# Patient Record
Sex: MALE | Race: WHITE
[De-identification: names, ages, dates, MRNs, and addresses within clinical notes are randomized per-mention and may not be internally consistent; named-entity substitution may affect disease eponyms.]

---

## 2017-06-06 ENCOUNTER — HOSPITAL ENCOUNTER (EMERGENCY)
Dept: HOSPITAL 45 - C.EDB | Age: 47
Discharge: HOME | End: 2017-06-06
Payer: COMMERCIAL

## 2017-06-06 VITALS
BODY MASS INDEX: 28.91 KG/M2 | BODY MASS INDEX: 28.91 KG/M2 | WEIGHT: 201.94 LBS | HEIGHT: 70 IN | WEIGHT: 201.94 LBS | HEIGHT: 70 IN

## 2017-06-06 VITALS — SYSTOLIC BLOOD PRESSURE: 139 MMHG | HEART RATE: 94 BPM | OXYGEN SATURATION: 94 % | DIASTOLIC BLOOD PRESSURE: 101 MMHG

## 2017-06-06 VITALS — TEMPERATURE: 98.24 F

## 2017-06-06 DIAGNOSIS — S61.211A: Primary | ICD-10-CM

## 2017-06-06 DIAGNOSIS — R03.0: ICD-10-CM

## 2017-06-06 DIAGNOSIS — Z88.5: ICD-10-CM

## 2017-06-06 DIAGNOSIS — S61.213A: ICD-10-CM

## 2017-06-06 DIAGNOSIS — Z23: ICD-10-CM
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
## 2017-06-06 DIAGNOSIS — K21.9: ICD-10-CM

## 2017-06-06 NOTE — EMERGENCY ROOM VISIT NOTE
History


First contact with patient:  19:43


Chief Complaint:  LACERATION/CUT (SUT/DERMABOND)


Stated Complaint:  CUT 2 FINGERS ON L HAND


Nursing Triage Summary:  


Lacerations to left index and middle fingers from a .





History of Present Illness


The patient is a 46 year old male who presents to the Emergency Room with 

complaints of lacerations to his left index and long finger.  The patient was 

working on a lawnmower when he cut his fingers on a fender.  The patient 

reports significant bleeding from the wounds.  He rates his discomfort a 2 out 

of 10.  The patient is right-hand-dominant.  He is uncertain of his last 

tetanus immunization.





Review of Systems


10 system review was performed and was negative except for pertinent positives 

and negatives as indicated in history of present illness





Past Medical/Surgical History


Medical Problems:


(1) GERD (gastroesophageal reflux disease)








Family History


Unremarkable





Social History


Smoking Status:  Never Smoker


Alcohol Use:  heavy


Marital Status:  


Housing Status:  lives with family


Occupation Status:  retired





Current/Historical Medications


Scheduled


Cephalexin Monohydrate (Keflex), 500 MG PO QID





Scheduled PRN


Acetaminophen (Tylenol), 1,000 MG PO Q6 PRN for Pain


Tramadol (Ultram), 1-2 TAB PO Q4H PRN for Pain





Allergies


Coded Allergies:  


     Codeine (Verified  Allergy, Unknown, 6/6/17)





Physical Exam


Vital Signs











  Date Time  Temp Pulse Resp B/P (MAP) Pulse Ox O2 Delivery O2 Flow Rate FiO2


 


6/6/17 19:38 36.8 88 16 149/95 99 Room Air  











Physical Exam


CONSTITUTIONAL:  Healthy and well nourished.  Alert and oriented X 3 with 

positive affect.  Patient does not appear in any acute distress.  Smell of EtOH 

is present.


HEENT:  Normocephalic, atraumatic.  Pupils equal, round and reactive.  


NECK:  Full active range of motion without discomfort.


MUSCULOSKELETAL: Examination shows lacerations of the left index and long 

fingers.  The lacerations are transverse in orientation across the middle 

phalanx regions.  No active bleeding noted.  The patient is able to flex his 

fingers against resistance.  Capillary refill is less than 2 seconds.  Total 

laceration length of both fingers is 3 cm.


INTEGUMENTARY:  No rash or other significant dermatologic conditions noted.


NEUROLOGIC: Left index and third fingertips are sensory intact.





Medical Decision & Procedures


Medications Administered











 Medications


  (Trade)  Dose


 Ordered  Sig/Jemima


 Route  Start Time


 Stop Time Status Last Admin


Dose Admin


 


 Lidocaine HCl


  (Buffered


 Lidocaine 1% Inj)  20 ml  ONE  ONCE


 INFIL  6/6/17 20:00


 6/6/17 20:02 DC 6/6/17 20:06


20 ML


 


 Diphtheria/


 Pertussis/Tetanus


 Vacc


  (Adacel Inj)  0.5 ml  ONCE ONCE


 IM.  6/6/17 20:00


 6/6/17 20:02 DC 6/6/17 20:08


0.5 ML


 


 Tramadol HCl


  (Ultram Home


 Pack)  1 homepack  UD  ONCE


 PO  6/6/17 21:00


 6/6/17 21:01 DC 6/6/17 21:13


1 HOMEPACK


 


 Cephalexin


 Monohydrate


  (Keflex 500MG


 Home Pack)  1 homepack  NOW  ONCE


 PO  6/6/17 21:00


 6/6/17 21:01 DC 6/6/17 21:12


1 HOMEPACK











Procedure


Laceration repair was performed under digital block anesthesia by our physician 

assistant student under my direct supervision.  The patient also provided 

verbal consent for this procedure.  Using buffered 1% lidocaine without 

epinephrine, good digital block anesthesia was administered.  The wounds were 

then peripherally cleansed with iodine.  The wounds were thoroughly pressure 

irrigated.  The patient did have an active bleeding arterial of the index 

finger.  At this point, a 5-0 nylon figure-of-eight suture was applied with 

excellent hemostasis.  The remaining wounds were then approximated using 5-0 

nylon simple interrupted sutures.  Bacitracin dressings were applied.





ED Course


Patient history and physical exam were performed.  Nurse's notes were reviewed.

  The patient was administered Adacel IM.  Laceration repair was performed 

under digital block anesthesia.  The patient was provided a prescription for 

Keflex to minimize risk for infection.  He was encouraged to alternate 

ibuprofen and Tylenol for baseline pain relief.  He did receive a home pack and 

prescription for Ultram as needed for breakthrough pain.  Suture removal in 12-

14 days, or seek reevaluation sooner for any signs of infection.  The patient 

was happy with plan of care, and denied any pain at the time of discharge.





The patient was also advised that his blood pressures were elevated while in 

the emergency department.  He was instructed to follow-up with his family 

doctor for blood pressure recheck.





Medical Decision








Impression





 Primary Impression:  


 Laceration of left index finger


 Additional Impressions:  


 Laceration of left middle finger


 Elevated blood pressure reading





Departure Information


Dispostion


Home / Self-Care





Prescriptions





Tramadol (Ultram) 50 Mg Tab


1-2 TAB PO Q4H Y for Pain, #20 TAB


   For Initial Treatment


   Prov: Rob Aldridge PA         6/6/17 


Cephalexin Monohydrate (Keflex) 500 Mg Cap


500 MG PO QID for 7 Days, #28 CAP


   Prov: Rob Aldridge PA         6/6/17





Forms


HOME CARE DOCUMENTATION FORM,                                                 

               IMPORTANT VISIT INFORMATION





Patient Instructions


[facility]





Additional Instructions





Keep wound clean and dry.  Do not allow any crusting or dried blood to 

accumulate on sutures.  If this occurs, use a 1:1 solution of hydrogen peroxide/

water on a Q-tip to clean the wound.  Use an antibiotic ointment for 3-4 days, 

then let wound dry.  Suture removal in 12-14 days.  You may call your family 

doctor to schedule an appointment for suture removal.  Return to the emergency 

department for any signs of infection (increasing redness, swelling, drainage).





Ice and elevate for swelling and pain. 


Complete all Keflex antibiotics as prescribed - Keflex 500 mg 4 times daily. 


Ibuprofen 800 mg and/or Tylenol 1000 mg every 8 hours.


You may also alternate these medications for more effective pain relief:


Ibuprofen --4 HRS--> Tylenol --4 HRS--> ibuprofen --4 HRS--> Tylenol ....


Ultram if needed for worse pain.





Problem Qualifiers








 Primary Impression:  


 Laceration of left index finger


 Encounter type:  initial encounter  Damage to nail status:  without damage  

Foreign body presence:  without foreign body  Qualified Codes:  S61.211A - 

Laceration without foreign body of left index finger without damage to nail, 

initial encounter


 Additional Impressions:  


 Laceration of left middle finger


 Encounter type:  initial encounter  Damage to nail status:  without damage  

Foreign body presence:  without foreign body  Qualified Codes:  S61.213A - 

Laceration without foreign body of left middle finger without damage to nail, 

initial encounter

## 2018-02-17 ENCOUNTER — HOSPITAL ENCOUNTER (EMERGENCY)
Dept: HOSPITAL 45 - C.EDB | Age: 48
Discharge: HOME | End: 2018-02-17
Payer: COMMERCIAL

## 2018-02-17 VITALS — TEMPERATURE: 98.24 F

## 2018-02-17 VITALS
HEIGHT: 70 IN | WEIGHT: 196.21 LBS | BODY MASS INDEX: 28.09 KG/M2 | BODY MASS INDEX: 28.09 KG/M2 | WEIGHT: 196.21 LBS | HEIGHT: 70 IN

## 2018-02-17 VITALS — SYSTOLIC BLOOD PRESSURE: 139 MMHG | DIASTOLIC BLOOD PRESSURE: 89 MMHG | OXYGEN SATURATION: 95 % | HEART RATE: 77 BPM

## 2018-02-17 DIAGNOSIS — J02.9: ICD-10-CM

## 2018-02-17 DIAGNOSIS — R50.9: Primary | ICD-10-CM

## 2018-02-17 DIAGNOSIS — R21: ICD-10-CM

## 2018-02-17 DIAGNOSIS — Z88.6: ICD-10-CM

## 2018-02-17 DIAGNOSIS — K21.9: ICD-10-CM

## 2018-02-17 DIAGNOSIS — J34.89: ICD-10-CM

## 2018-02-17 DIAGNOSIS — R19.7: ICD-10-CM

## 2018-02-17 DIAGNOSIS — Z72.89: ICD-10-CM

## 2018-02-17 LAB
ALBUMIN SERPL-MCNC: 3.4 GM/DL (ref 3.4–5)
ALP SERPL-CCNC: 90 U/L (ref 45–117)
ALT SERPL-CCNC: 36 U/L (ref 12–78)
AST SERPL-CCNC: 15 U/L (ref 15–37)
BASOPHILS # BLD: 0.06 K/UL (ref 0–0.2)
BASOPHILS NFR BLD: 0.8 %
BUN SERPL-MCNC: 14 MG/DL (ref 7–18)
CALCIUM SERPL-MCNC: 8.9 MG/DL (ref 8.5–10.1)
CO2 SERPL-SCNC: 27 MMOL/L (ref 21–32)
CREAT SERPL-MCNC: 0.88 MG/DL (ref 0.6–1.4)
EOS ABS #: 0.43 K/UL (ref 0–0.5)
EOSINOPHIL NFR BLD AUTO: 245 K/UL (ref 130–400)
GLUCOSE SERPL-MCNC: 106 MG/DL (ref 70–99)
HCT VFR BLD CALC: 41.5 % (ref 42–52)
HGB BLD-MCNC: 14.8 G/DL (ref 14–18)
IG#: 0.05 K/UL (ref 0–0.02)
IMM GRANULOCYTES NFR BLD AUTO: 24.7 %
INR PPP: 1 (ref 0.9–1.1)
LYMPHOCYTES # BLD: 1.79 K/UL (ref 1.2–3.4)
MCH RBC QN AUTO: 31.7 PG (ref 25–34)
MCHC RBC AUTO-ENTMCNC: 35.7 G/DL (ref 32–36)
MCV RBC AUTO: 88.9 FL (ref 80–100)
MONO ABS #: 0.58 K/UL (ref 0.11–0.59)
MONOCYTES NFR BLD: 8 %
NEUT ABS #: 4.34 K/UL (ref 1.4–6.5)
NEUTROPHILS # BLD AUTO: 5.9 %
NEUTROPHILS NFR BLD AUTO: 59.9 %
PMV BLD AUTO: 9 FL (ref 7.4–10.4)
POTASSIUM SERPL-SCNC: 4 MMOL/L (ref 3.5–5.1)
PROT SERPL-MCNC: 7.1 GM/DL (ref 6.4–8.2)
RED CELL DISTRIBUTION WIDTH CV: 12.3 % (ref 11.5–14.5)
RED CELL DISTRIBUTION WIDTH SD: 39.1 FL (ref 36.4–46.3)
SODIUM SERPL-SCNC: 137 MMOL/L (ref 136–145)
WBC # BLD AUTO: 7.25 K/UL (ref 4.8–10.8)

## 2018-02-17 NOTE — EMERGENCY ROOM VISIT NOTE
History


Report prepared by Dean:  Cheyenne Pelaez


Under the Supervision of:  Dr. Karey Bentley D.O.


First contact with patient:  16:28


Chief Complaint:  FLU LIKE SX


Stated Complaint:  POSSIBLE SHINGLES





History of Present Illness


The patient is a 47 year old male who presents to the Emergency Room with 

complaints of persistent flu like symptoms for the past 3 days. He is 

accompanied by his wife. He states he has experienced cold sweats, fevers, 

chills, rhinorrhea, a sore throat. He last had Ibuprofen for the fever at 1300 

today. He denies any cough except for occasionally needing to clear mucous from 

his throat. He has been using over the counter cold medications with good 

relief. About 2 days ago, he developed a rash on his back. His wife is 

concerned he may have shingles and the patient notes he did have chicken pox as 

a child. The patient admits to diarrhea for the past few days. He has been 

having 4 to 5 episodes a day. He denies any melena but states his stool did 

look "dark black". He has not been vomiting. He has been experiencing more 

increased acid reflux recently but no longer takes daily Nexium. The patient 

admits to a history of diverticulitis approximately 2 years ago. His wife 

states he drinks "a lot of beer".





   Source of History:  patient, spouse/significant other (wife)


   Onset:  3 days PTA


   Position:  other (global)


   Timing:  other (persistent)


   Associated Symptoms:  + fevers, + chills, + diaphoresis, + sorethroat, + 

hematochezia, + rash (on back ), No cough, No vomiting, No melena





Review of Systems


See HPI for pertinent positives & negatives. A total of 10 systems reviewed and 

were otherwise negative.





Past Medical & Surgical


Medical Problems:


(1) GERD (gastroesophageal reflux disease)








Social History


Smoking Status:  Never Smoker


Alcohol Use:  heavy


Drug Use:  none


Marital Status:  


Housing Status:  lives with family


Occupation Status:  retired





Current/Historical Medications


Scheduled


Dextromethorphan-Phenylephrine (Vicks Dayquil Cold & Flu), 2 CAP PO PRN UD


Esomeprazole Magnesium (Nexium), 1 CAP PO DAILY


Gabapentin (Neurontin), 100 MG PO TID





Scheduled PRN


Acetaminophen (Tylenol), 2-3 TABS PO Q6 PRN for Pain





Allergies


Coded Allergies:  


     Codeine (Verified  Allergy, Unknown, 6/6/17)





Physical Exam


Vital Signs











  Date Time  Temp Pulse Resp B/P (MAP) Pulse Ox O2 Delivery O2 Flow Rate FiO2


 


2/17/18 18:35  77 16 139/89 95   


 


2/17/18 17:43  77 16 139/89 95 Room Air  


 


2/17/18 16:23 36.8 79 18 158/97 97 Room Air  











Physical Exam


GENERAL: alert, anxious appearing, well nourished, no distress, non-toxic 


EYE EXAM: normal conjunctiva, PERRL and EOM's grossly intact


OROPHARYNX: no exudate, no erythema, lips, buccal mucosa, and tongue normal and 

mucous membranes are moist


NECK: supple, no nuchal rigidity, no adenopathy, non-tender


LUNGS: Clear to auscultation. Normal chest wall mechanics


HEART: no murmurs, S1 normal and S2 normal 


ABDOMEN: abdomen soft, non-tender, normo-active bowel sounds, no masses, no 

rebound or guarding. 


RECTAL: No hemorrhoids, no anal fissure, no stool in rectal vault, mucous is 

guaiac negative on testing. 


BACK: Back is symmetrical on inspection and there is no deformity, no midline 

tenderness, no CVA tenderness. 


SKIN: Scattered crusted over erythematous lesions noted to the right lateral 

lumbar area, not class in appearance for zoster, no vesicles, no evidence of 

cellulitis, no ecchymosis, no drainage. 


UPPER EXTREMITIES: upper extremities are grossly normal. 


LOWER EXTREMITIES: No pitting edema.


NEURO EXAM: Normal sensorium, cranial nerves II-XII grossly intact, normal 

speech, no gross weakness of arms, no gross weakness of legs.





Medical Decision & Procedures


Laboratory Results


2/17/18 17:15








Red Blood Count 4.67, Mean Corpuscular Volume 88.9, Mean Corpuscular Hemoglobin 

31.7, Mean Corpuscular Hemoglobin Concent 35.7, Mean Platelet Volume 9.0, 

Neutrophils (%) (Auto) 59.9, Lymphocytes (%) (Auto) 24.7, Monocytes (%) (Auto) 

8.0, Eosinophils (%) (Auto) 5.9, Basophils (%) (Auto) 0.8, Neutrophils # (Auto) 

4.34, Lymphocytes # (Auto) 1.79, Monocytes # (Auto) 0.58, Eosinophils # (Auto) 

0.43, Basophils # (Auto) 0.06





2/17/18 17:15

















Test


  2/17/18


17:15


 


White Blood Count


  7.25 K/uL


(4.8-10.8)


 


Red Blood Count


  4.67 M/uL


(4.7-6.1)


 


Hemoglobin


  14.8 g/dL


(14.0-18.0)


 


Hematocrit 41.5 % (42-52) 


 


Mean Corpuscular Volume


  88.9 fL


()


 


Mean Corpuscular Hemoglobin


  31.7 pg


(25-34)


 


Mean Corpuscular Hemoglobin


Concent 35.7 g/dl


(32-36)


 


Platelet Count


  245 K/uL


(130-400)


 


Mean Platelet Volume


  9.0 fL


(7.4-10.4)


 


Neutrophils (%) (Auto) 59.9 % 


 


Lymphocytes (%) (Auto) 24.7 % 


 


Monocytes (%) (Auto) 8.0 % 


 


Eosinophils (%) (Auto) 5.9 % 


 


Basophils (%) (Auto) 0.8 % 


 


Neutrophils # (Auto)


  4.34 K/uL


(1.4-6.5)


 


Lymphocytes # (Auto)


  1.79 K/uL


(1.2-3.4)


 


Monocytes # (Auto)


  0.58 K/uL


(0.11-0.59)


 


Eosinophils # (Auto)


  0.43 K/uL


(0-0.5)


 


Basophils # (Auto)


  0.06 K/uL


(0-0.2)


 


RDW Standard Deviation


  39.1 fL


(36.4-46.3)


 


RDW Coefficient of Variation


  12.3 %


(11.5-14.5)


 


Immature Granulocyte % (Auto) 0.7 % 


 


Immature Granulocyte # (Auto)


  0.05 K/uL


(0.00-0.02)


 


Prothrombin Time


  10.6 SECONDS


(9.0-12.0)


 


Prothromb Time International


Ratio 1.0 (0.9-1.1) 


 


 


Anion Gap


  6.0 mmol/L


(3-11)


 


Est Creatinine Clear Calc


Drug Dose 115.3 ml/min 


 


 


Estimated GFR (


American) 117.7 


 


 


Estimated GFR (Non-


American 101.6 


 


 


BUN/Creatinine Ratio 15.3 (10-20) 


 


Calcium Level


  8.9 mg/dl


(8.5-10.1)


 


Total Bilirubin


  0.5 mg/dl


(0.2-1)


 


Aspartate Amino Transf


(AST/SGOT) 15 U/L (15-37) 


 


 


Alanine Aminotransferase


(ALT/SGPT) 36 U/L (12-78) 


 


 


Alkaline Phosphatase


  90 U/L


()


 


Total Protein


  7.1 gm/dl


(6.4-8.2)


 


Albumin


  3.4 gm/dl


(3.4-5.0)


 


Globulin


  3.7 gm/dl


(2.5-4.0)


 


Albumin/Globulin Ratio 0.9 (0.9-2) 





Laboratory results per my review.





Medications Administered











 Medications


  (Trade)  Dose


 Ordered  Sig/Jemima


 Route  Start Time


 Stop Time Status Last Admin


Dose Admin


 


 Pantoprazole


 Sodium 40 mg/


 Syringe  10 ml @ 5


 mls/min  NOW  ONCE


 IV  2/17/18 17:15


 2/17/18 17:16 DC 2/17/18 18:28


5 MLS/MIN


 


 Gabapentin


  (Neurontin Cap)  100 mg  NOW  STAT


 PO  2/17/18 17:55


 2/17/18 17:56 DC 2/17/18 18:32


100 MG











ED Course


1637: The patient was evaluated in room C5. A complete history and physical 

exam was performed. 





1715: Pantoprazole Sodium 40 mg/Syringe 10 ml @ 5 mls/min IV. 





1755: Gabapentin 100 mg PO. 





1756: I reevaluated the patient. He is feeling better and resting comfortably. 

I updated him on his results and discharge instructions and he verbalized 

complete understanding and agreement.





Medical Decision


Prior records/ancillary studies reviewed.


Triage Nursing notes reviewed.





The patient's history was concerning for fever.





Differential diagnosis:


Etiologies such as viral syndrome, otitis, pharyngitis, pneumonia, influenza, 

meningitis, urinary tract infection, sepsis, bacteremia, as well as others were 

entertained.





Patient well appearing here despite complaints.  Patient heme-negative on 

guaiac testing and had a soft and nontender abdomen.  Patient's labs 

reassuring.  Discussed with him avoidance of acidity in his diet given the 

recent increasing GERD.  Discussed risk for peptic ulcer disease and GI bleed.  

Discussed close follow-up with family doctor in possible need for additional GI 

evaluation given history.  Discussed use of an acid reducing medication.  

Discussed symptoms to watch and return for, he verbalized understanding was 

agreeable with plan.  Patient with no recurrence of any symptoms while here, 

well-appearing at time of discharge, vital signs stable and tolerating by 

mouth.  Rash on the right lateral lumbar region not classic in appearance for 

zoster however possible given the patient is previously had chickenpox as a 

child.  Areas were scattered and not coalescing, mild erythematous base noted 

however no vesicles and no crusting.  Area appeared more consistent with skin 

irritation or prickly heat that was excoriated scratching.





Medication Reconcilliation


Current Medication List:  was personally reviewed by me





Blood Pressure Screening


Patient's blood pressure:  Elevated blood pressure


Blood pressure disposition:  Elevated BP felt to be situational





Impression





 Primary Impression:  


 Influenza-like symptoms


 Additional Impressions:  


 GERD (gastroesophageal reflux disease)


 Rash





Scribe Attestation


The scribe's documentation has been prepared under my direction and personally 

reviewed by me in its entirety. I confirm that the note above accurately 

reflects all work, treatment, procedures, and medical decision making performed 

by me.





Departure Information


Dispostion


Home / Self-Care





Prescriptions





Gabapentin (Neurontin) 100 Mg Cap


100 MG PO TID for Pain, #30 CAP


   Prov: Karey Bentley, DO         2/17/18 


Esomeprazole Magnesium (NEXIUM) 40 Mg Cap


1 CAP PO DAILY for 30 Days, #30 CAP


   Prov: Karey Bentley, DO         2/17/18





Referrals


No Doctor, Assigned (PCP)





Patient Instructions


ED GERD, ED Shingles, ED Viral Syndrome, [facility]





Additional Instructions





Please avoid acidic food and drink in your diet which could contribute to your 

reflex.  Please take the acid reducing medication daily as prescribed.  You may 

continue use Tylenol for fevers and body aches.  Be very cautious about using 

ibuprofen/Advil/Aleve as this could contribute to stomach irritation.  If you 

are going to use one of those anti-inflammatories please do not take it on an 

empty stomach.  He may use the other nerve pain pill as prescribed for the back 

pain in the area of your possible shingles.  You may have an additional rash 

that seems to follow your rib around to the front on that side.  This is a 

normal pattern for shingles.  This will slowly resolve on its own.  Until all 

the lesions are crusted over her you are considered contagious.  Please avoid 

anyone who is pregnant or immunocompromise during this time.  If you have any 

worsening pain, have worsening reflux, develop vomiting, increased diarrhea, 

noticed black or bloody stools, persistent fevers, dizziness, weakness, or you'

ve any other new concerns, please return the emergency room.





Problem Qualifiers








 Additional Impressions:  


 GERD (gastroesophageal reflux disease)


 Esophagitis presence:  esophagitis presence not specified  Qualified Codes:  

K21.9 - Gastro-esophageal reflux disease without esophagitis